# Patient Record
Sex: MALE | Race: WHITE | URBAN - METROPOLITAN AREA
[De-identification: names, ages, dates, MRNs, and addresses within clinical notes are randomized per-mention and may not be internally consistent; named-entity substitution may affect disease eponyms.]

---

## 2017-02-14 ENCOUNTER — OFFICE VISIT (OUTPATIENT)
Dept: OTOLARYNGOLOGY | Facility: CLINIC | Age: 52
End: 2017-02-14

## 2017-02-14 VITALS
WEIGHT: 295 LBS | HEIGHT: 72 IN | DIASTOLIC BLOOD PRESSURE: 80 MMHG | TEMPERATURE: 98 F | SYSTOLIC BLOOD PRESSURE: 116 MMHG | BODY MASS INDEX: 39.96 KG/M2

## 2017-02-14 DIAGNOSIS — H61.21 IMPACTED CERUMEN OF RIGHT EAR: Primary | ICD-10-CM

## 2017-02-14 PROCEDURE — 69210 REMOVE IMPACTED EAR WAX UNI: CPT | Performed by: OTOLARYNGOLOGY

## 2017-02-14 RX ORDER — CARVEDILOL PHOSPHATE 20 MG
20 CAPSULE,EXTENDED RELEASE MULTIPHASE 24HR ORAL
Refills: 2 | COMMUNITY
Start: 2017-01-23

## 2017-02-14 RX ORDER — CIPROFLOXACIN 0.5 MG/.25ML
SOLUTION/ DROPS AURICULAR (OTIC)
Refills: 0 | COMMUNITY
Start: 2017-01-26

## 2017-02-14 RX ORDER — LEVOFLOXACIN 500 MG/1
TABLET, FILM COATED ORAL
Refills: 0 | COMMUNITY
Start: 2017-02-13

## 2017-02-15 NOTE — PROGRESS NOTES
Shi Cherry is a 49year old male.   Patient presents with:  Ear Problem: plugged right ear for 3 weeks, ear pain      HISTORY OF PRESENT ILLNESS    Patient presents for cerumen removal. No other complaints or concerns at this time    Social History    [facility]aura PHYSICAL EXAM    /80 mmHg  Temp(Src) 97.5 °F (36.4 °C) (Tympanic)  Ht 6' (1.829 m)  Wt 295 lb (133.811 kg)  BMI 40.00 kg/m2       Constitutional Normal Overall appearance - Normal.        Neck Exam Normal Inspection - Normal. Palpation - No 1  ASSESSMENT AND PLAN    1. Impacted cerumen of right ear    - wax removal      All cerumen was removed using microscopy. I have asked the patient to return to see me as needed for repeat cerumen removal in the future. John Orellana.  Juventino Alarcon MD    2/14/2017

## (undated) NOTE — MR AVS SNAPSHOT
[address]  [phone]               Thank you for choosing us for your health care visit with Maciej Toledo MD.  We are glad to serve you and happy to provide you with this summar Sign up for [employer], your secure online medical record. [employer] will allow you to access patient instructions from your recent visit,  view other health information, and more. To sign up or find more information, go to [email or web address]